# Patient Record
Sex: FEMALE | Race: WHITE
[De-identification: names, ages, dates, MRNs, and addresses within clinical notes are randomized per-mention and may not be internally consistent; named-entity substitution may affect disease eponyms.]

---

## 2017-09-12 ENCOUNTER — HOSPITAL ENCOUNTER (OUTPATIENT)
Dept: HOSPITAL 45 - C.MAMM | Age: 62
Discharge: HOME | End: 2017-09-12
Attending: OBSTETRICS & GYNECOLOGY
Payer: COMMERCIAL

## 2017-09-12 DIAGNOSIS — Z12.31: Primary | ICD-10-CM

## 2017-09-13 NOTE — MAMMOGRAPHY REPORT
BILATERAL DIGITAL SCREENING MAMMOGRAM TOMOSYNTHESIS WITH CAD: 9/12/2017

CLINICAL HISTORY: Routine screening.  Patient has no complaints.  





TECHNIQUE:  Breast tomosynthesis in addition to standard 2D mammography was performed. Current study 
was also evaluated with a Computer Aided Detection (CAD) system.  



COMPARISON: Comparison is made to exams dated:  9/7/2016 mammogram, 9/2/2015 mammogram, 5/9/2014 mamm
ogram, 5/3/2013 mammogram, 4/25/2012 mammogram, and 4/1/2011 mammogram - [facility]
.   



BREAST COMPOSITION:  The tissue of both breasts is heterogeneously dense, which may obscure small mas
ses.  



FINDINGS: There are scattered and loosely grouped stable benign-appearing round and punctate microcal
cifications.  No suspicious mass, architectural distortion or cluster of new, suspicious microcalcifi
cations is seen.  



IMPRESSION:  ACR BI-RADS CATEGORY 1: NEGATIVE

There is no mammographic evidence of malignancy. A 1 year screening mammogram is recommended.  The pa
tient will receive written notification of the results.  





Approximately 10% of breast cancers are not detected with mammography. A negative mammographic report
 should not delay biopsy if a clinically suggestive mass is present.



Yola Dawn M.D.          

ay/:9/12/2017 18:31:50  



Imaging Technologist: Marjan CHRISTIE)(M), [facility]

letter sent: Normal 1/2  

BI-RADS Code: ACR BI-RADS Category 1: Negative

## 2018-02-23 ENCOUNTER — HOSPITAL ENCOUNTER (OUTPATIENT)
Dept: HOSPITAL 45 - C.GI | Age: 63
Discharge: HOME | End: 2018-02-23
Attending: INTERNAL MEDICINE
Payer: COMMERCIAL

## 2018-02-23 VITALS
HEIGHT: 65 IN | HEIGHT: 65 IN | BODY MASS INDEX: 21.37 KG/M2 | WEIGHT: 128.26 LBS | WEIGHT: 128.26 LBS | BODY MASS INDEX: 21.37 KG/M2

## 2018-02-23 VITALS — SYSTOLIC BLOOD PRESSURE: 102 MMHG | DIASTOLIC BLOOD PRESSURE: 68 MMHG | OXYGEN SATURATION: 96 % | HEART RATE: 66 BPM

## 2018-02-23 DIAGNOSIS — Z12.11: Primary | ICD-10-CM

## 2018-02-23 DIAGNOSIS — Z90.89: ICD-10-CM

## 2018-02-23 DIAGNOSIS — Z88.1: ICD-10-CM

## 2018-02-23 DIAGNOSIS — D12.5: ICD-10-CM

## 2018-02-23 DIAGNOSIS — Z88.0: ICD-10-CM

## 2018-02-23 DIAGNOSIS — K21.9: ICD-10-CM

## 2018-02-23 DIAGNOSIS — E03.9: ICD-10-CM

## 2018-02-23 DIAGNOSIS — K57.30: ICD-10-CM

## 2018-02-23 DIAGNOSIS — Z88.2: ICD-10-CM

## 2018-02-23 DIAGNOSIS — J45.909: ICD-10-CM

## 2018-02-23 DIAGNOSIS — K62.1: ICD-10-CM

## 2018-02-23 DIAGNOSIS — K64.8: ICD-10-CM

## 2018-02-23 DIAGNOSIS — E78.00: ICD-10-CM

## 2018-02-23 DIAGNOSIS — Z88.8: ICD-10-CM

## 2018-02-23 DIAGNOSIS — Z87.891: ICD-10-CM

## 2018-02-23 DIAGNOSIS — D12.2: ICD-10-CM

## 2018-02-23 NOTE — ANESTHESIOLOGY PROGRESS NOTE
Anesthesia Post Op Note


Date & Time


Feb 23, 2018 at 10:36





Vital Signs


Pain Intensity:  0





Vital Signs Past 12 Hours








  Date Time  Temp Pulse Resp B/P (MAP) Pulse Ox O2 Delivery O2 Flow Rate FiO2


 


2/23/18 10:28 36.5 76 18 93/58 (70) 98 Room Air  


 


2/23/18 09:29 36.7 75 18 114/70 (85) 99 Room Air  











Notes


Mental Status:  alert / awake / arousable, participated in evaluation


Pt Amnestic to Procedure:  Yes


Nausea / Vomiting:  adequately controlled


Pain:  adequately controlled


Airway Patency, RR, SpO2:  stable & adequate


BP & HR:  stable & adequate


Hydration State:  stable & adequate


Anesthetic Complications:  no major complications apparent

## 2018-02-23 NOTE — GI REPORT
Procedure Date: 2/23/2018 9:38 AM

Procedure:            Colonoscopy

Indications:          Screening for colorectal malignant neoplasm

Medicines:            Monitored Anesthesia Care

Complications:        No immediate complications.

Estimated Blood Loss: Estimated blood loss: none.

Procedure:            Pre-Anesthesia Assessment:

                      - Prior to the procedure, a History and Physical was 

                      performed, and patient medications and allergies were 

                      reviewed. The patient's tolerance of previous 

                      anesthesia was also reviewed. The risks and benefits of 

                      the procedure and the sedation options and risks were 

                      discussed with the patient. All questions were 

                      answered, and informed consent was obtained. Prior 

                      Anticoagulants: The patient has taken no previous 

                      anticoagulant or antiplatelet agents. ASA Grade 

                      Assessment: II - A patient with mild systemic disease. 

                      After reviewing the risks and benefits, the patient was 

                      deemed in satisfactory condition to undergo the 

                      procedure.

                      After I obtained informed consent, the scope was passed 

                      under direct vision. Throughout the procedure, the 

                      patient's blood pressure, pulse, and oxygen saturations 

                      were monitored continuously. The scope was introduced 

                      through the anus and advanced to the terminal ileum. 

                      The colonoscopy was performed without difficulty. The 

                      patient tolerated the procedure well. The quality of 

                      the bowel preparation was good. The terminal ileum, 

                      ileocecal valve, appendiceal orifice, and rectum were 

                      photographed.

Findings:

     The perianal and digital rectal examinations were normal.

     A 12 mm polyp was found in the ascending colon. The polyp was flat. The 

     polyp was removed with a saline injection-lift technique using a hot 

     snare. Resection and retrieval were complete. To prevent bleeding after 

     the polypectomy, two hemostatic clips were successfully placed (MR 

     conditional). There was no bleeding at the end of the procedure.

     Four sessile polyps were found in the sigmoid colon and ascending colon. 

     The polyps were 5 to 9 mm in size. These polyps were removed with a hot 

     snare. Resection and retrieval were complete.

     A 4 mm polyp was found in the rectum. The polyp was sessile. The polyp 

     was removed with a hot snare. Resection was complete, but the polyp 

     tissue was not retrieved.

     Multiple small-mouthed diverticula were found in the sigmoid colon.

     Non-bleeding internal hemorrhoids were found during retroflexion. The 

     hemorrhoids were small.

Impression:           - One 12 mm polyp in the ascending colon, removed using 

                      injection-lift and a hot snare. Resected and retrieved. 

                      Clips (MR conditional) were placed.

                      - Four 5 to 9 mm polyps in the sigmoid colon and in the 

                      ascending colon, removed with a hot snare. Resected and 

                      retrieved.

                      - One 4 mm polyp in the rectum, removed with a hot 

                      snare. Complete resection. Polyp tissue not retrieved.

                      - Diverticulosis in the sigmoid colon.

                      - Non-bleeding internal hemorrhoids.

Recommendation:       - Resume previous diet.

                      - Continue present medications.

                      - Repeat colonoscopy for surveillance based on 

                      pathology results.

                      - Return to primary care physician as previously 

                      scheduled.

Joey Joyce, DO

2/23/2018 10:41:11 AM

This report has been signed electronically.

Note Initiated On: 2/23/2018 9:38 AM

     I attest to the content of the Intraoperative Record and orders 

     documented therein, exceptions below

## 2018-02-23 NOTE — ENDO HISTORY AND PHYSICAL
History & Physical


Date of Service:


Feb 23, 2018.


Chief Complaint:


Screening


Referring Physician:


Dr. Garrido


History of Present Illness


61 yo CF who presents for screening colonoscopy.





Past Medical History


Asthma, Gastrointestinal Disorder, Reflux, High Cholesterol, Thyroid Disease





Past Surgical History


Hx Cardiac Surgery:  No


Hx Internal Defibrillator:  No


Hx Pacemaker:  No


Hx Abdominal Surgery:  Yes (APPY, LAPAROSCOPY)


Hx of Implantable Prosthesis:  No


Hx Post-Op Nausea and Vomiting:  Yes


Hx Cancer Surgery:  No


Hx Thoracic Surgery:  No


Hx Orthopedic:  No


Hx Urinary Tract Surgery:  No





Family History


None





Social History


Smoking Status:  Former Smoker


Hx Substance Use:  No


Hx Alcohol Use:  Yes (OCCASIONALLY)





Allergies


Coded Allergies:  


     Dairy (Verified  Allergy, Unknown, CONGESTED, 2/16/18)


     Erythromycin (Verified  Allergy, Unknown, RASH, 2/16/18)


     Latex (Verified  Allergy, Unknown, RASH, 2/16/18)


     Penicillins (Verified  Allergy, Unknown, HIVES, 2/16/18)


     Sulfa Antibiotics (Verified  Allergy, Unknown, HIVES, 2/16/18)


     Tetracycline (Verified  Allergy, Unknown, RASH, 2/16/18)


     Ketorolac Tromethamine (Verified  Adverse Reaction, Unknown, N/V, 2/16/18)


     Meperidine (Verified  Adverse Reaction, Unknown, N/V, 2/16/18)


     Tramadol (Verified  Adverse Reaction, Unknown, N/V, 2/16/18)


Uncoded Allergies:  


     PEANUTS (Allergy, Unknown, CONGESTED, 5/3/16)





Current Medications





Reported Home Medications








 Medications  Dose


 Route/Sig


 Max Daily Dose Days Date Category


 


 Astelin Nasal


 Spray (Azelastine


 Hcl) 200


 Sprays/30 Ml Spray  1-2 Sprays


 NA BID PRN


    2/16/18 Reported


 


 Vitamin B Complex


  (B-Complex


 Vitamins) 1 Tab


 Tab  1 Tab


 PO 3XWK


    2/16/18 Reported


 


 Calcium/Magnesium


  (Calcium W/


 Magnesium) 1 Tab


 Tab  1 Tab


 PO QAM


    2/16/18 Reported


 


 Omega-3 (Fish


 Oil) 1 Ea Cap  1 Cap


 PO QPM


    2/16/18 Reported


 


 Allergy Nasal


 [place] 24 Ho


  (Fluticasone


 Propionate


  (Nasal)) 50


 Mcg/Act Spr  1 Spray


 LIAM DAILY PRN


    2/16/18 Reported


 


 Zantac


  (Ranitidine HCl)


 150 Mg Tab  150 Mg


 PO DAILY PRN


    2/16/18 Reported


 


 Protonix


  (Pantoprazole


 Sodium) 40 Mg Tab  40 Mg


 PO QAM


    2/16/18 Reported


 


 Synthroid


  (Levothyroxine


 Sodium) 88 Mcg Tab  88 Mcg


 PO QAM


    2/16/18 Reported


 


 Vitamin D


  (Cholecalciferol)


 2,000 Unit Tab  2 Tab


 PO QAM


    5/3/16 Reported


 


 Dulera 200/5 Mcg


  (Mometasone


 Furoate-Formoterol)


 1 Aer Aer  2 Puffs


 INH BID


   30 5/3/16 Reported


 


 Singulair


  (Montelukast


 Sodium) 10 Mg Tab  10 Mg


 PO QAM


    5/3/16 Reported


 


 Zocor


  (Simvastatin) 40


 Mg Tab  40 Mg


 PO QAM


    5/3/16 Reported


 


 Zinc (Zinc


 Gluconate) 50 Mg


 Tab  50 Mg


 PO QAM


    6/30/14 Reported


 


 Multivitamin


  (Multivitamins)


 Tab  1 Tab


 PO QAM


    6/30/14 Reported


 


 Elavil


  (Amitriptyline


 Hcl) 25 Mg Tab  25 Mg


 PO HS


    6/30/14 Reported











Vital Signs


Weight (Kilograms):  58.18


Height (Feet):  5


Height (Inches):  5





Physical Exam


General Appearance:  WD/WN, no apparent distress


Respiratory/Chest:  


   Auscultation:  breath sounds normal


Cardiovascular:  


   Heart Auscultation:  RRR


Abdomen:  


   Bowel Sounds:  normal


   Inspection & Palpation:  soft, non-distended, no tenderness, guarding & 

rebound





Assessment and Plan


Assessment:


61 yo CF who presents for screening colonoscopy.








Plan:


Proceed with colonoscopy.

## 2018-04-25 ENCOUNTER — HOSPITAL ENCOUNTER (OUTPATIENT)
Dept: HOSPITAL 45 - C.PAPS | Age: 63
Discharge: HOME | End: 2018-04-25
Attending: OBSTETRICS & GYNECOLOGY
Payer: COMMERCIAL

## 2018-04-25 DIAGNOSIS — Z01.419: Primary | ICD-10-CM

## 2018-04-25 DIAGNOSIS — Z11.51: ICD-10-CM

## 2024-02-02 NOTE — DISCHARGE INSTRUCTIONS
Endoscopy Patient Instructions


Date / Procedure(s) Performed


Feb 23, 2018.


Colonoscopy





Allergy Information


Coded Allergies:  


     Dairy (Verified  Allergy, Unknown, CONGESTED, 2/16/18)


     Erythromycin (Verified  Allergy, Unknown, RASH, 2/16/18)


     Latex (Verified  Allergy, Unknown, RASH, 2/16/18)


     Penicillins (Verified  Allergy, Unknown, HIVES, 2/16/18)


     Sulfa Antibiotics (Verified  Allergy, Unknown, HIVES, 2/16/18)


     Tetracycline (Verified  Allergy, Unknown, RASH, 2/16/18)


     Ketorolac Tromethamine (Verified  Adverse Reaction, Unknown, N/V, 2/16/18)


     Meperidine (Verified  Adverse Reaction, Unknown, N/V, 2/16/18)


     Tramadol (Verified  Adverse Reaction, Unknown, N/V, 2/16/18)


Uncoded Allergies:  


     PEANUTS (Allergy, Unknown, CONGESTED, 5/3/16)





Discharge Date / Findings


Feb 23, 2018.


Colon polyps


Diverticulosis


Internal hemorrhoids





Medication Instructions


Stopped Medication(s):  


stopped all supplements on Wednesday


OK to resume all medications today as prescribed





Reported Home Medications








 Medications  Dose


 Route/Sig


 Max Daily Dose Days Date Category


 


 Astelin Nasal


 Spray (Azelastine


 Hcl) 200


 Sprays/30 Ml Spray  1-2 Sprays


 NA BID PRN


    2/16/18 Reported


 


 Vitamin B Complex


  (B-Complex


 Vitamins) 1 Tab


 Tab  1 Tab


 PO 3XWK


    2/16/18 Reported


 


 Calcium/Magnesium


  (Calcium W/


 Magnesium) 1 Tab


 Tab  1 Tab


 PO QAM


    2/16/18 Reported


 


 Omega-3 (Fish


 Oil) 1 Ea Cap  1 Cap


 PO QPM


    2/16/18 Reported


 


 Allergy Nasal


 [place] 24 Ho


  (Fluticasone


 Propionate


  (Nasal)) 50


 Mcg/Act Spr  1 Spray


 LIAM DAILY PRN


    2/16/18 Reported


 


 Zantac


  (Ranitidine HCl)


 150 Mg Tab  150 Mg


 PO DAILY PRN


    2/16/18 Reported


 


 Protonix


  (Pantoprazole


 Sodium) 40 Mg Tab  40 Mg


 PO QAM


    2/16/18 Reported


 


 Synthroid


  (Levothyroxine


 Sodium) 88 Mcg Tab  88 Mcg


 PO QAM


    2/16/18 Reported


 


 Vitamin D


  (Cholecalciferol)


 2,000 Unit Tab  2 Tab


 PO QAM


    5/3/16 Reported


 


 Dulera 200/5 Mcg


  (Mometasone


 Furoate-Formoterol)


 1 Aer Aer  2 Puffs


 INH BID


   30 5/3/16 Reported


 


 Singulair


  (Montelukast


 Sodium) 10 Mg Tab  10 Mg


 PO QAM


    5/3/16 Reported


 


 Zocor


  (Simvastatin) 40


 Mg Tab  40 Mg


 PO QAM


    5/3/16 Reported


 


 Zinc (Zinc


 Gluconate) 50 Mg


 Tab  50 Mg


 PO QAM


    6/30/14 Reported


 


 Multivitamin


  (Multivitamins)


 Tab  1 Tab


 PO QAM


    6/30/14 Reported


 


 Elavil


  (Amitriptyline


 Hcl) 25 Mg Tab  25 Mg


 PO HS


    6/30/14 Reported











Provider Instructions





Activity Restrictions





-  No exercising or heavy lifting for 24 hours. 


-  Do not drink alcohol the day of the procedure.


-  Do not drive a car or operate machinery until the day after the procedure.


-  Do not make any important decisions or sign important papers in 24 hours 

after the procedure.





Following Day:





-  Return to full activity which may include returning to work/school.





Diet





Start your diet with liquids and light foods (jello, soup, juice, toast).  Then 

eat your usual diet if not nauseated.





Treatment For Common After Affects





For mild abdominal pain, bloating, or excessive gas:





-  Rest


-  Eat lightly


-  Lie on right side





Follow-Up Information


Follow-up with Dr.Tania Garrido as scheduled





Anesthesia Information





What You Should Know





You have had a procedure that required some medicine to reduce anxiety and 

discomfort. This treatment is called moderate sedation.  


After receiving the treatment, you may be sleepy, but you will be able to 

breathe on your own.  The effects of the treatment may last for several hours.








Follow these instructions along with Activity/Diet recommendations noted above:





*  Do NOT do anything where dizziness or clumsiness would be dangerous.





*  Rest quietly at home today, then you can be up and about tomorrow.





*  Have a responsible person stay with you the rest of today.





*  You may have had an I.V. today.  If so, you may take the dressing off later 

today.





Recommendations


 


Call your doctor if:





*  Trouble breathing 





*  Continuous vomiting for more than 24 hours








*  Temperature above 101 degrees





*  Severe abdominal pain or bloating





*  Pain not relieved by pain medicine ordered





*  There is increased drainage or redness from any incision





*  A large amount of rectal bleeding greater than 2-3 tablespoons. 


   (If you had a polyp/s removed or have hemorrhoids, a small amount of blood -


    from the rectum is to be expected.)





*  You have any unanswered questions or concerns.








IN THE EVENT OF A SERIOUS EMERGENCY, GO TO THE NEAREST EMERGENCY ROOM








       Your discharge instructions were prepared by provider Joey Joyce.





 Patient Instructions Signature Page














Vivien Hernandez 











Patient (or Guardian) Signature/Date:____________________________________ I 

have read and understand the instructions given to me by my caregivers.








Caregiver/RN/Doctor Signature/Date:____________________________________











The above-named patient and/or guardian has received patient instructions on 

this date.





























+  Original Patient Signature Page (only) stays with chart.  Please make copy 

for patient. The patient is a 8y Female complaining of eye redness.